# Patient Record
Sex: MALE | Race: WHITE | NOT HISPANIC OR LATINO | Employment: FULL TIME | URBAN - METROPOLITAN AREA
[De-identification: names, ages, dates, MRNs, and addresses within clinical notes are randomized per-mention and may not be internally consistent; named-entity substitution may affect disease eponyms.]

---

## 2020-02-28 ENCOUNTER — HOSPITAL ENCOUNTER (INPATIENT)
Facility: HOSPITAL | Age: 25
LOS: 5 days | Discharge: HOME | End: 2020-03-04
Attending: EMERGENCY MEDICINE | Admitting: INTERNAL MEDICINE

## 2020-02-28 DIAGNOSIS — T79.6XXA TRAUMATIC RHABDOMYOLYSIS, INITIAL ENCOUNTER (HCC): Primary | ICD-10-CM

## 2020-02-28 PROBLEM — K90.89 BILE ACID MALABSORPTION SYNDROME: Status: ACTIVE | Noted: 2020-02-28

## 2020-02-28 PROBLEM — R74.8 ELEVATED LIVER ENZYMES: Status: ACTIVE | Noted: 2020-02-28

## 2020-02-28 PROBLEM — M62.82 RHABDOMYOLYSIS: Status: ACTIVE | Noted: 2020-02-28

## 2020-02-28 PROBLEM — K52.9 COLITIS: Status: ACTIVE | Noted: 2020-02-28

## 2020-02-28 LAB
ALBUMIN SERPL-MCNC: 4.7 G/DL (ref 3.5–5.2)
ALBUMIN/GLOB SERPL: 1.5 G/DL
ALP SERPL-CCNC: 69 U/L (ref 39–117)
ALT SERPL W P-5'-P-CCNC: 447 U/L (ref 1–41)
ANION GAP SERPL CALCULATED.3IONS-SCNC: 10 MMOL/L (ref 5–15)
AST SERPL-CCNC: 1501 U/L (ref 1–40)
BACTERIA UR QL AUTO: NORMAL /HPF
BASOPHILS # BLD AUTO: 0.02 10*3/MM3 (ref 0–0.2)
BASOPHILS NFR BLD AUTO: 0.5 % (ref 0–1.5)
BILIRUB SERPL-MCNC: 0.3 MG/DL (ref 0.2–1.2)
BILIRUB UR QL STRIP: NEGATIVE
BUN BLD-MCNC: 9 MG/DL (ref 6–20)
BUN/CREAT SERPL: 9.7 (ref 7–25)
CALCIUM SPEC-SCNC: 9.6 MG/DL (ref 8.6–10.5)
CHLORIDE SERPL-SCNC: 100 MMOL/L (ref 98–107)
CK MB SERPL-CCNC: 8.13 NG/ML
CK SERPL-CCNC: ABNORMAL U/L (ref 20–200)
CLARITY UR: CLEAR
CO2 SERPL-SCNC: 31 MMOL/L (ref 22–29)
COLOR UR: YELLOW
CREAT BLD-MCNC: 0.93 MG/DL (ref 0.76–1.27)
D-LACTATE SERPL-SCNC: 0.9 MMOL/L (ref 0.5–2)
DEPRECATED RDW RBC AUTO: 37.7 FL (ref 37–54)
EOSINOPHIL # BLD AUTO: 0.08 10*3/MM3 (ref 0–0.4)
EOSINOPHIL NFR BLD AUTO: 1.9 % (ref 0.3–6.2)
ERYTHROCYTE [DISTWIDTH] IN BLOOD BY AUTOMATED COUNT: 11.6 % (ref 12.3–15.4)
GFR SERPL CREATININE-BSD FRML MDRD: 98 ML/MIN/1.73
GLOBULIN UR ELPH-MCNC: 3.1 GM/DL
GLUCOSE BLD-MCNC: 113 MG/DL (ref 65–99)
GLUCOSE UR STRIP-MCNC: NEGATIVE MG/DL
HAV IGM SERPL QL IA: NORMAL
HBV CORE IGM SERPL QL IA: NORMAL
HBV SURFACE AG SERPL QL IA: NORMAL
HCT VFR BLD AUTO: 51.3 % (ref 37.5–51)
HCV AB SER DONR QL: NORMAL
HGB BLD-MCNC: 16.9 G/DL (ref 13–17.7)
HGB UR QL STRIP.AUTO: ABNORMAL
HOLD SPECIMEN: NORMAL
HOLD SPECIMEN: NORMAL
HYALINE CASTS UR QL AUTO: NORMAL /LPF
IMM GRANULOCYTES # BLD AUTO: 0.01 10*3/MM3 (ref 0–0.05)
IMM GRANULOCYTES NFR BLD AUTO: 0.2 % (ref 0–0.5)
KETONES UR QL STRIP: NEGATIVE
LEUKOCYTE ESTERASE UR QL STRIP.AUTO: NEGATIVE
LIPASE SERPL-CCNC: 27 U/L (ref 13–60)
LYMPHOCYTES # BLD AUTO: 1.37 10*3/MM3 (ref 0.7–3.1)
LYMPHOCYTES NFR BLD AUTO: 32 % (ref 19.6–45.3)
MCH RBC QN AUTO: 29.5 PG (ref 26.6–33)
MCHC RBC AUTO-ENTMCNC: 32.9 G/DL (ref 31.5–35.7)
MCV RBC AUTO: 89.7 FL (ref 79–97)
MONOCYTES # BLD AUTO: 0.41 10*3/MM3 (ref 0.1–0.9)
MONOCYTES NFR BLD AUTO: 9.6 % (ref 5–12)
MYOGLOBIN SERPL-MCNC: 3000 NG/ML (ref 28–72)
NEUTROPHILS # BLD AUTO: 2.39 10*3/MM3 (ref 1.7–7)
NEUTROPHILS NFR BLD AUTO: 55.8 % (ref 42.7–76)
NITRITE UR QL STRIP: NEGATIVE
NRBC BLD AUTO-RTO: 0 /100 WBC (ref 0–0.2)
PH UR STRIP.AUTO: 7 [PH] (ref 5–8)
PHOSPHATE SERPL-MCNC: 3.2 MG/DL (ref 2.5–4.5)
PLATELET # BLD AUTO: 197 10*3/MM3 (ref 140–450)
PMV BLD AUTO: 10.1 FL (ref 6–12)
POTASSIUM BLD-SCNC: 5 MMOL/L (ref 3.5–5.2)
PROT SERPL-MCNC: 7.8 G/DL (ref 6–8.5)
PROT UR QL STRIP: NEGATIVE
RBC # BLD AUTO: 5.72 10*6/MM3 (ref 4.14–5.8)
RBC # UR: NORMAL /HPF
REF LAB TEST METHOD: NORMAL
SODIUM BLD-SCNC: 141 MMOL/L (ref 136–145)
SP GR UR STRIP: <=1.005 (ref 1–1.03)
SQUAMOUS #/AREA URNS HPF: NORMAL /HPF
URATE SERPL-MCNC: 3.6 MG/DL (ref 3.4–7)
UROBILINOGEN UR QL STRIP: ABNORMAL
WBC NRBC COR # BLD: 4.28 10*3/MM3 (ref 3.4–10.8)
WBC UR QL AUTO: NORMAL /HPF
WHOLE BLOOD HOLD SPECIMEN: NORMAL
WHOLE BLOOD HOLD SPECIMEN: NORMAL

## 2020-02-28 PROCEDURE — 84100 ASSAY OF PHOSPHORUS: CPT | Performed by: INTERNAL MEDICINE

## 2020-02-28 PROCEDURE — 82553 CREATINE MB FRACTION: CPT | Performed by: EMERGENCY MEDICINE

## 2020-02-28 PROCEDURE — 83874 ASSAY OF MYOGLOBIN: CPT | Performed by: EMERGENCY MEDICINE

## 2020-02-28 PROCEDURE — 84550 ASSAY OF BLOOD/URIC ACID: CPT | Performed by: INTERNAL MEDICINE

## 2020-02-28 PROCEDURE — 83690 ASSAY OF LIPASE: CPT | Performed by: EMERGENCY MEDICINE

## 2020-02-28 PROCEDURE — 82550 ASSAY OF CK (CPK): CPT | Performed by: EMERGENCY MEDICINE

## 2020-02-28 PROCEDURE — 80074 ACUTE HEPATITIS PANEL: CPT | Performed by: INTERNAL MEDICINE

## 2020-02-28 PROCEDURE — 99283 EMERGENCY DEPT VISIT LOW MDM: CPT

## 2020-02-28 PROCEDURE — 85025 COMPLETE CBC W/AUTO DIFF WBC: CPT | Performed by: EMERGENCY MEDICINE

## 2020-02-28 PROCEDURE — 81001 URINALYSIS AUTO W/SCOPE: CPT | Performed by: EMERGENCY MEDICINE

## 2020-02-28 PROCEDURE — 99222 1ST HOSP IP/OBS MODERATE 55: CPT | Performed by: INTERNAL MEDICINE

## 2020-02-28 PROCEDURE — 80053 COMPREHEN METABOLIC PANEL: CPT | Performed by: EMERGENCY MEDICINE

## 2020-02-28 PROCEDURE — 83605 ASSAY OF LACTIC ACID: CPT | Performed by: INTERNAL MEDICINE

## 2020-02-28 RX ORDER — ONDANSETRON 2 MG/ML
4 INJECTION INTRAMUSCULAR; INTRAVENOUS EVERY 6 HOURS PRN
Status: DISCONTINUED | OUTPATIENT
Start: 2020-02-28 | End: 2020-03-04 | Stop reason: HOSPADM

## 2020-02-28 RX ORDER — SODIUM CHLORIDE 0.9 % (FLUSH) 0.9 %
10 SYRINGE (ML) INJECTION EVERY 12 HOURS SCHEDULED
Status: DISCONTINUED | OUTPATIENT
Start: 2020-02-28 | End: 2020-03-04 | Stop reason: HOSPADM

## 2020-02-28 RX ORDER — SODIUM CHLORIDE 9 MG/ML
100 INJECTION, SOLUTION INTRAVENOUS CONTINUOUS
Status: DISCONTINUED | OUTPATIENT
Start: 2020-02-28 | End: 2020-03-04 | Stop reason: HOSPADM

## 2020-02-28 RX ORDER — MONTELUKAST SODIUM 4 MG/1
1 TABLET, CHEWABLE ORAL DAILY
COMMUNITY

## 2020-02-28 RX ORDER — NORTRIPTYLINE HYDROCHLORIDE 25 MG/1
25 CAPSULE ORAL NIGHTLY
COMMUNITY

## 2020-02-28 RX ORDER — SODIUM CHLORIDE 0.9 % (FLUSH) 0.9 %
10 SYRINGE (ML) INJECTION AS NEEDED
Status: DISCONTINUED | OUTPATIENT
Start: 2020-02-28 | End: 2020-03-04 | Stop reason: HOSPADM

## 2020-02-28 RX ORDER — NORTRIPTYLINE HYDROCHLORIDE 25 MG/1
25 CAPSULE ORAL NIGHTLY
Status: DISCONTINUED | OUTPATIENT
Start: 2020-02-28 | End: 2020-03-04 | Stop reason: HOSPADM

## 2020-02-28 RX ORDER — CHOLESTYRAMINE LIGHT 4 G/5.7G
1 POWDER, FOR SUSPENSION ORAL EVERY 12 HOURS SCHEDULED
Status: DISCONTINUED | OUTPATIENT
Start: 2020-02-28 | End: 2020-02-29

## 2020-02-28 RX ADMIN — SODIUM CHLORIDE 200 ML/HR: 9 INJECTION, SOLUTION INTRAVENOUS at 22:35

## 2020-02-28 RX ADMIN — SODIUM CHLORIDE 200 ML/HR: 9 INJECTION, SOLUTION INTRAVENOUS at 15:24

## 2020-02-28 RX ADMIN — SODIUM CHLORIDE, PRESERVATIVE FREE 10 ML: 5 INJECTION INTRAVENOUS at 22:05

## 2020-02-28 RX ADMIN — NORTRIPTYLINE HYDROCHLORIDE 25 MG: 25 CAPSULE ORAL at 22:04

## 2020-02-28 RX ADMIN — SODIUM CHLORIDE 2000 ML: 9 INJECTION, SOLUTION INTRAVENOUS at 14:27

## 2020-02-29 LAB
ALBUMIN SERPL-MCNC: 3.8 G/DL (ref 3.5–5.2)
ALBUMIN/GLOB SERPL: 1.5 G/DL
ALP SERPL-CCNC: 59 U/L (ref 39–117)
ALT SERPL W P-5'-P-CCNC: 339 U/L (ref 1–41)
ANION GAP SERPL CALCULATED.3IONS-SCNC: 7 MMOL/L (ref 5–15)
AST SERPL-CCNC: 948 U/L (ref 1–40)
BILIRUB SERPL-MCNC: 0.3 MG/DL (ref 0.2–1.2)
BUN BLD-MCNC: 9 MG/DL (ref 6–20)
BUN/CREAT SERPL: 10.3 (ref 7–25)
CALCIUM SPEC-SCNC: 9.1 MG/DL (ref 8.6–10.5)
CHLORIDE SERPL-SCNC: 104 MMOL/L (ref 98–107)
CK SERPL-CCNC: ABNORMAL U/L (ref 20–200)
CO2 SERPL-SCNC: 29 MMOL/L (ref 22–29)
CREAT BLD-MCNC: 0.87 MG/DL (ref 0.76–1.27)
GFR SERPL CREATININE-BSD FRML MDRD: 106 ML/MIN/1.73
GLOBULIN UR ELPH-MCNC: 2.5 GM/DL
GLUCOSE BLD-MCNC: 116 MG/DL (ref 65–99)
HOLD SPECIMEN: NORMAL
POTASSIUM BLD-SCNC: 4.4 MMOL/L (ref 3.5–5.2)
PROT SERPL-MCNC: 6.3 G/DL (ref 6–8.5)
SODIUM BLD-SCNC: 140 MMOL/L (ref 136–145)

## 2020-02-29 PROCEDURE — 80053 COMPREHEN METABOLIC PANEL: CPT | Performed by: INTERNAL MEDICINE

## 2020-02-29 PROCEDURE — 99233 SBSQ HOSP IP/OBS HIGH 50: CPT | Performed by: HOSPITALIST

## 2020-02-29 PROCEDURE — 82550 ASSAY OF CK (CPK): CPT | Performed by: INTERNAL MEDICINE

## 2020-02-29 RX ORDER — MONTELUKAST SODIUM 4 MG/1
1 TABLET, CHEWABLE ORAL DAILY
Status: DISCONTINUED | OUTPATIENT
Start: 2020-02-29 | End: 2020-03-04 | Stop reason: HOSPADM

## 2020-02-29 RX ADMIN — SODIUM CHLORIDE 200 ML/HR: 9 INJECTION, SOLUTION INTRAVENOUS at 19:57

## 2020-02-29 RX ADMIN — SODIUM CHLORIDE 200 ML/HR: 9 INJECTION, SOLUTION INTRAVENOUS at 14:52

## 2020-02-29 RX ADMIN — SODIUM CHLORIDE, PRESERVATIVE FREE 10 ML: 5 INJECTION INTRAVENOUS at 22:18

## 2020-02-29 RX ADMIN — SODIUM CHLORIDE 200 ML/HR: 9 INJECTION, SOLUTION INTRAVENOUS at 03:08

## 2020-02-29 RX ADMIN — COLESTIPOL HYDROCHLORIDE 1 G: 1 TABLET ORAL at 11:18

## 2020-02-29 RX ADMIN — SODIUM CHLORIDE 200 ML/HR: 9 INJECTION, SOLUTION INTRAVENOUS at 08:50

## 2020-02-29 RX ADMIN — NORTRIPTYLINE HYDROCHLORIDE 25 MG: 25 CAPSULE ORAL at 22:16

## 2020-03-01 LAB
ALBUMIN SERPL-MCNC: 3.8 G/DL (ref 3.5–5.2)
ALBUMIN/GLOB SERPL: 1.7 G/DL
ALP SERPL-CCNC: 63 U/L (ref 39–117)
ALT SERPL W P-5'-P-CCNC: 276 U/L (ref 1–41)
ANION GAP SERPL CALCULATED.3IONS-SCNC: 8 MMOL/L (ref 5–15)
AST SERPL-CCNC: 526 U/L (ref 1–40)
BILIRUB SERPL-MCNC: 0.2 MG/DL (ref 0.2–1.2)
BUN BLD-MCNC: 7 MG/DL (ref 6–20)
BUN/CREAT SERPL: 8.5 (ref 7–25)
CALCIUM SPEC-SCNC: 8.8 MG/DL (ref 8.6–10.5)
CHLORIDE SERPL-SCNC: 104 MMOL/L (ref 98–107)
CK SERPL-CCNC: ABNORMAL U/L (ref 20–200)
CO2 SERPL-SCNC: 30 MMOL/L (ref 22–29)
CREAT BLD-MCNC: 0.82 MG/DL (ref 0.76–1.27)
GFR SERPL CREATININE-BSD FRML MDRD: 114 ML/MIN/1.73
GLOBULIN UR ELPH-MCNC: 2.3 GM/DL
GLUCOSE BLD-MCNC: 91 MG/DL (ref 65–99)
POTASSIUM BLD-SCNC: 3.9 MMOL/L (ref 3.5–5.2)
PROT SERPL-MCNC: 6.1 G/DL (ref 6–8.5)
SODIUM BLD-SCNC: 142 MMOL/L (ref 136–145)

## 2020-03-01 PROCEDURE — 82550 ASSAY OF CK (CPK): CPT | Performed by: HOSPITALIST

## 2020-03-01 PROCEDURE — 99232 SBSQ HOSP IP/OBS MODERATE 35: CPT | Performed by: HOSPITALIST

## 2020-03-01 PROCEDURE — 80053 COMPREHEN METABOLIC PANEL: CPT | Performed by: HOSPITALIST

## 2020-03-01 RX ADMIN — SODIUM CHLORIDE 200 ML/HR: 9 INJECTION, SOLUTION INTRAVENOUS at 07:29

## 2020-03-01 RX ADMIN — SODIUM CHLORIDE, PRESERVATIVE FREE 10 ML: 5 INJECTION INTRAVENOUS at 20:41

## 2020-03-01 RX ADMIN — SODIUM CHLORIDE 200 ML/HR: 9 INJECTION, SOLUTION INTRAVENOUS at 18:25

## 2020-03-01 RX ADMIN — SODIUM CHLORIDE 200 ML/HR: 9 INJECTION, SOLUTION INTRAVENOUS at 13:35

## 2020-03-01 RX ADMIN — NORTRIPTYLINE HYDROCHLORIDE 25 MG: 25 CAPSULE ORAL at 20:42

## 2020-03-01 RX ADMIN — SODIUM CHLORIDE 200 ML/HR: 9 INJECTION, SOLUTION INTRAVENOUS at 02:25

## 2020-03-01 NOTE — PLAN OF CARE
Patients VSS, reports bilateral upper arm pain but declines PRN medications.  IV fluids continue at 200ml/hr.  Patient up independently and encouraged to get OOB and walk the halls.

## 2020-03-01 NOTE — PLAN OF CARE
Fluids continuing at 200ml/hr, tolerating w/o complaint. independently took a shower, pt rested throughout the night. Continues to complain of upper arm pain, pt refuses offered prn med for relief.

## 2020-03-01 NOTE — PROGRESS NOTES
[facility]  PROGRESS NOTE    Patient Name: Koffi Zaragoza  : 1995  MRN: [id number]    Date of Admission: 2020  Primary Care Physician: Provider, No Known    Subjective   Subjective     CC:  F/U muscle pain    HPI:  Patient seen this morning. Complains of weakness in his arms. Still feel quite sore. He is able to ambulate freely without any issues however.     Review of Systems  Gen-no fevers, no chills  CV-no chest pain, no palpitations  Resp-no cough, no dyspnea  GI-no N/V/D, no abd pain    All other systems reviewed and negative except any additional pertinent positives and negatives as discussed in HPI.       Objective   Objective     Vital Signs:   Temp:  [97.5 °F (36.4 °C)-97.9 °F (36.6 °C)] 97.6 °F (36.4 °C)  Heart Rate:  [80-85] 85  Resp:  [16-18] 16  BP: (109-141)/(70-89) 134/81        Physical Exam:  Gen-no acute distress  HENT-NCAT, mucous membranes moist  CV-RRR, S1 S2 normal, no m/r/g  Resp-CTAB, no wheezes or rales  Abd-soft, NT, ND, +BS  Ext-mild BUE edema, improved  Neuro-A&Ox3, no focal deficits  Skin-no rashes  Psych-appropriate mood      Results Reviewed:  Results from last 7 days   Lab Units 20  1317   WBC 10*3/mm3 4.28   HEMOGLOBIN g/dL 16.9   HEMATOCRIT % 51.3*   PLATELETS 10*3/mm3 197     Results from last 7 days   Lab Units 20  0957 20  0721 20  1317   SODIUM mmol/L 142 140 141   POTASSIUM mmol/L 3.9 4.4 5.0   CHLORIDE mmol/L 104 104 100   CO2 mmol/L 30.0* 29.0 31.0*   BUN mg/dL 7 9 9   CREATININE mg/dL 0.82 0.87 0.93   GLUCOSE mg/dL 91 116* 113*   CALCIUM mg/dL 8.8 9.1 9.6   ALT (SGPT) U/L 276* 339* 447*   AST (SGOT) U/L 526* 948* 1,501*     Estimated Creatinine Clearance: 179.6 mL/min (by C-G formula based on SCr of 0.82 mg/dL).    Microbiology Results Abnormal     None          Imaging Results (Last 24 Hours)     ** No results found for the last 24 hours. 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        Brief Hospital Course to date:  Koffi Zaragoza is a 24 y.o. male with hx of IBS and bile salt malabsorption syndrome who presents to the ER due to muscle pain and dark colored urine after a hard workout routine 4 days prior. Found to have rhabdomyolysis.    Acute traumatic rhabdomyolysis  --Likely due to aggressive physical activity.  --CK level still >22,000. Check daily CK levels.  --Continue aggressive IV fluids.  --Monitor renal function and electrolytes.    Transaminitis  --Related to rhabdo.  --Negative acute hepatitis panel.  --LFT's trending down, continue to monitor.    Bile acid malabsorption syndrome  --Continue Colestipol.    DVT Prophylaxis: mechanical    Disposition: I expect the patient to be discharged TBD, depends on CK level trend    CODE STATUS:   Code Status and Medical Interventions:   Ordered at: 02/28/20 1436     Level Of Support Discussed With:    Patient     Code Status:    CPR     Medical Interventions (Level of Support Prior to Arrest):    Full         Electronically signed by Sonya Lyons MD, 03/01/20, 1:12 PM.

## 2020-03-02 PROBLEM — M62.82 RHABDOMYOLYSIS: Status: ACTIVE | Noted: 2020-02-28

## 2020-03-02 LAB
ALBUMIN SERPL-MCNC: 3.8 G/DL (ref 3.5–5.2)
ALBUMIN/GLOB SERPL: 2 G/DL
ALP SERPL-CCNC: 64 U/L (ref 39–117)
ALT SERPL W P-5'-P-CCNC: 235 U/L (ref 1–41)
ANION GAP SERPL CALCULATED.3IONS-SCNC: 9 MMOL/L (ref 5–15)
AST SERPL-CCNC: 323 U/L (ref 1–40)
BILIRUB SERPL-MCNC: 0.2 MG/DL (ref 0.2–1.2)
BUN BLD-MCNC: 7 MG/DL (ref 6–20)
BUN/CREAT SERPL: 9.5 (ref 7–25)
CALCIUM SPEC-SCNC: 8.6 MG/DL (ref 8.6–10.5)
CHLORIDE SERPL-SCNC: 102 MMOL/L (ref 98–107)
CK SERPL-CCNC: ABNORMAL U/L (ref 20–200)
CO2 SERPL-SCNC: 30 MMOL/L (ref 22–29)
CREAT BLD-MCNC: 0.74 MG/DL (ref 0.76–1.27)
GFR SERPL CREATININE-BSD FRML MDRD: 128 ML/MIN/1.73
GLOBULIN UR ELPH-MCNC: 1.9 GM/DL
GLUCOSE BLD-MCNC: 109 MG/DL (ref 65–99)
POTASSIUM BLD-SCNC: 3.9 MMOL/L (ref 3.5–5.2)
PROT SERPL-MCNC: 5.7 G/DL (ref 6–8.5)
SODIUM BLD-SCNC: 141 MMOL/L (ref 136–145)

## 2020-03-02 PROCEDURE — 80053 COMPREHEN METABOLIC PANEL: CPT | Performed by: HOSPITALIST

## 2020-03-02 PROCEDURE — 99232 SBSQ HOSP IP/OBS MODERATE 35: CPT | Performed by: HOSPITALIST

## 2020-03-02 PROCEDURE — 82550 ASSAY OF CK (CPK): CPT | Performed by: HOSPITALIST

## 2020-03-02 RX ORDER — ACETAMINOPHEN 325 MG/1
650 TABLET ORAL EVERY 8 HOURS PRN
Status: DISCONTINUED | OUTPATIENT
Start: 2020-03-02 | End: 2020-03-04 | Stop reason: HOSPADM

## 2020-03-02 RX ADMIN — SODIUM CHLORIDE 200 ML/HR: 9 INJECTION, SOLUTION INTRAVENOUS at 09:15

## 2020-03-02 RX ADMIN — SODIUM CHLORIDE, PRESERVATIVE FREE 10 ML: 5 INJECTION INTRAVENOUS at 20:48

## 2020-03-02 RX ADMIN — SODIUM CHLORIDE, PRESERVATIVE FREE 10 ML: 5 INJECTION INTRAVENOUS at 09:16

## 2020-03-02 RX ADMIN — SODIUM CHLORIDE 200 ML/HR: 9 INJECTION, SOLUTION INTRAVENOUS at 10:29

## 2020-03-02 RX ADMIN — SODIUM CHLORIDE 200 ML/HR: 9 INJECTION, SOLUTION INTRAVENOUS at 21:42

## 2020-03-02 RX ADMIN — SODIUM CHLORIDE 200 ML/HR: 9 INJECTION, SOLUTION INTRAVENOUS at 15:34

## 2020-03-02 NOTE — PLAN OF CARE
Problem: Patient Care Overview  Goal: Plan of Care Review  Outcome: Ongoing (interventions implemented as appropriate)  Flowsheets  Taken 3/2/2020 1832  Progress: improving  Taken 3/2/2020 1036  Plan of Care Reviewed With: patient  Note:   Patient VSS. Took a shower and ambulated in the halls. CK levels decreasing and fluids continued at 200ml/hr. Pt complains of bilateral upper extremity pain but declines pain medication. Will continue to monitor.

## 2020-03-02 NOTE — PROGRESS NOTES
[facility] Medicine [facility]  PROGRESS NOTE    Patient Name: Koffi Zaragoza  : 1995  MRN: [id number]    Date of Admission: 2020  Primary Care Physician: Provider, No Known    Subjective   Subjective     CC:  F/U muscle pain    HPI:  Patient seen this morning. Complains of some tightness on the back of his right arm. Still sore in arms but overall feels fine.    Review of Systems  Gen-no fevers, no chills  CV-no chest pain, no palpitations  Resp-no cough, no dyspnea  GI-no N/V/D, no abd pain    All other systems reviewed and negative except any additional pertinent positives and negatives as discussed in HPI.       Objective   Objective     Vital Signs:   Temp:  [97.2 °F (36.2 °C)-98.2 °F (36.8 °C)] 98.1 °F (36.7 °C)  Heart Rate:  [79-82] 79  Resp:  [16-18] 18  BP: (109-128)/(60-89) 128/84        Physical Exam:  Gen-no acute distress  HENT-NCAT, mucous membranes moist  CV-RRR, S1 S2 normal, no m/r/g  Resp-CTAB, no wheezes or rales  Abd-soft, NT, ND, +BS  Ext-mild BUE muscle swelling, improved  Neuro-A&Ox3, no focal deficits  Skin-no rashes  Psych-appropriate mood      Results Reviewed:  Results from last 7 days   Lab Units 20  1317   WBC 10*3/mm3 4.28   HEMOGLOBIN g/dL 16.9   HEMATOCRIT % 51.3*   PLATELETS 10*3/mm3 197     Results from last 7 days   Lab Units 20  0513 20  0957 20  0721   SODIUM mmol/L 141 142 140   POTASSIUM mmol/L 3.9 3.9 4.4   CHLORIDE mmol/L 102 104 104   CO2 mmol/L 30.0* 30.0* 29.0   BUN mg/dL 7 7 9   CREATININE mg/dL 0.74* 0.82 0.87   GLUCOSE mg/dL 109* 91 116*   CALCIUM mg/dL 8.6 8.8 9.1   ALT (SGPT) U/L 235* 276* 339*   AST (SGOT) U/L 323* 526* 948*     Estimated Creatinine Clearance: 199 mL/min (A) (by C-G formula based on SCr of 0.74 mg/dL (L)).    Microbiology Results Abnormal     None          Imaging Results (Last 24 Hours)     ** No results found for the last 24 hours. 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        Brief Hospital Course to date:  Koffi Zaragoza is a 24 y.o. male with hx of IBS and bile salt malabsorption syndrome who presents to the ER due to muscle pain and dark colored urine after a hard workout routine 4 days prior. Found to have rhabdomyolysis.    Acute traumatic rhabdomyolysis  --Likely due to aggressive physical activity.  --CK level now detectable at 10,625. Check daily CK levels.  --Continue aggressive IV fluids.  --Monitor renal function and electrolytes.    Transaminitis  --Related to rhabdo.  --Negative acute hepatitis panel.  --LFT's trending down, continue to monitor.    Bile acid malabsorption syndrome  --Continue Colestipol.    DVT Prophylaxis: mechanical    Disposition: I expect the patient to be discharged ~2-3 days, based on CK level trend    CODE STATUS:   Code Status and Medical Interventions:   Ordered at: 02/28/20 1436     Level Of Support Discussed With:    Patient     Code Status:    CPR     Medical Interventions (Level of Support Prior to Arrest):    Full         Electronically signed by Sonya Lyons MD, 03/02/20, 2:59 PM.

## 2020-03-02 NOTE — PROGRESS NOTES
Discharge Planning Assessment  [facility]     Patient Name: Koffi Zaragoza  MRN: [id number]  Today's Date: 3/2/2020    Admit Date: 2/28/2020    Discharge Needs Assessment     Row Name 03/02/20 1529       Living Environment    Lives With  spouse    Name(s) of Who Lives With Patient  Ale Zaragoza    Current Living Arrangements  home/apartment/condo    Primary Care Provided by  self    Provides Primary Care For  no one    Family Caregiver if Needed  spouse    Family Caregiver Names  Ale Zaragoza    Quality of Family Relationships  unable to assess    Able to Return to Prior Arrangements  yes       Resource/Environmental Concerns    Resource/Environmental Concerns  none       Transition Planning    Patient/Family Anticipates Transition to  home    Transportation Anticipated  car, drives self       Discharge Needs Assessment    Readmission Within the Last 30 Days  no previous admission in last 30 days    Concerns to be Addressed  no discharge needs identified;denies needs/concerns at this time    Equipment Currently Used at Home  none    Anticipated Changes Related to Illness  none    Equipment Needed After Discharge  none    Provided Post Acute Provider List?  N/A    N/A Provider List Comment  denies use or need    Discharge Coordination/Progress  Home        Discharge Plan     Row Name 03/02/20 153       Plan    Plan  Home    Patient/Family in Agreement with Plan  yes    Plan Comments  Spoke with patient at bedside regarding discharge planning.  Patient denies use or need for HH or DME and reports he has prescrition coverage.  Patient live with his wife in a mobile home with stairs to access but reports he is in excellent health and denies concern regarding home safety.  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  No discharge needs verbalized.  CM following. Patient plan is to discharge home via car driving self.      Final Discharge  Disposition Code  01 - home or self-care        Destination      Coordination has not been started for this encounter.      Durable Medical Equipment      Coordination has not been started for this encounter.      Dialysis/Infusion      Coordination has not been started for this encounter.      Home Medical Care      Coordination has not been started for this encounter.      Therapy      Coordination has not been started for this encounter.      Community Resources      Coordination has not been started for this encounter.        Expected Discharge Date and Time     Expected Discharge Date Expected Discharge Time    Mar 5, 2020         Demographic Summary     Row Name 03/02/20 1525       General Information    Admission Type  inpatient    Arrived From  home    Referral Source  admission list    Reason for Consult  discharge planning    Preferred Language  English     Used During This Interaction  no    General Information Comments  [facility]       Contact Information    Permission Granted to Share Info With      Contact Information Obtained for      Contact Information Comments  Ale Zaragoza, spouse  [phone]        Functional Status     Row Name 03/02/20 1528       Functional Status    Usual Activity Tolerance  excellent    Current Activity Tolerance  good       Functional Status, IADL    Medications  independent    Meal Preparation  independent    Housekeeping  independent    Laundry  independent    Shopping  independent       Employment/    Employment/ Comments  Workmans Comp        Psychosocial    No documentation.       Abuse/Neglect    No documentation.       Legal    No documentation.       Substance Abuse    No documentation.       Patient Forms    No documentation.           Jennifer Brower, RN

## 2020-03-03 LAB
ALBUMIN SERPL-MCNC: 3.6 G/DL (ref 3.5–5.2)
ALBUMIN/GLOB SERPL: 1.4 G/DL
ALP SERPL-CCNC: 60 U/L (ref 39–117)
ALT SERPL W P-5'-P-CCNC: 198 U/L (ref 1–41)
ANION GAP SERPL CALCULATED.3IONS-SCNC: 11 MMOL/L (ref 5–15)
AST SERPL-CCNC: 179 U/L (ref 1–40)
BILIRUB SERPL-MCNC: 0.2 MG/DL (ref 0.2–1.2)
BUN BLD-MCNC: 8 MG/DL (ref 6–20)
BUN/CREAT SERPL: 11.4 (ref 7–25)
CALCIUM SPEC-SCNC: 8.8 MG/DL (ref 8.6–10.5)
CHLORIDE SERPL-SCNC: 102 MMOL/L (ref 98–107)
CK SERPL-CCNC: 4681 U/L (ref 20–200)
CO2 SERPL-SCNC: 28 MMOL/L (ref 22–29)
CREAT BLD-MCNC: 0.7 MG/DL (ref 0.76–1.27)
GFR SERPL CREATININE-BSD FRML MDRD: 136 ML/MIN/1.73
GLOBULIN UR ELPH-MCNC: 2.5 GM/DL
GLUCOSE BLD-MCNC: 101 MG/DL (ref 65–99)
POTASSIUM BLD-SCNC: 4.3 MMOL/L (ref 3.5–5.2)
PROT SERPL-MCNC: 6.1 G/DL (ref 6–8.5)
SODIUM BLD-SCNC: 141 MMOL/L (ref 136–145)

## 2020-03-03 PROCEDURE — 80053 COMPREHEN METABOLIC PANEL: CPT | Performed by: HOSPITALIST

## 2020-03-03 PROCEDURE — 82550 ASSAY OF CK (CPK): CPT | Performed by: HOSPITALIST

## 2020-03-03 PROCEDURE — 99232 SBSQ HOSP IP/OBS MODERATE 35: CPT | Performed by: HOSPITALIST

## 2020-03-03 RX ADMIN — SODIUM CHLORIDE 200 ML/HR: 9 INJECTION, SOLUTION INTRAVENOUS at 23:40

## 2020-03-03 RX ADMIN — NORTRIPTYLINE HYDROCHLORIDE 25 MG: 25 CAPSULE ORAL at 22:13

## 2020-03-03 RX ADMIN — SODIUM CHLORIDE, PRESERVATIVE FREE 10 ML: 5 INJECTION INTRAVENOUS at 08:21

## 2020-03-03 RX ADMIN — SODIUM CHLORIDE 200 ML/HR: 9 INJECTION, SOLUTION INTRAVENOUS at 02:12

## 2020-03-03 RX ADMIN — SODIUM CHLORIDE 200 ML/HR: 9 INJECTION, SOLUTION INTRAVENOUS at 17:29

## 2020-03-03 RX ADMIN — NORTRIPTYLINE HYDROCHLORIDE 25 MG: 25 CAPSULE ORAL at 00:07

## 2020-03-03 RX ADMIN — SODIUM CHLORIDE, PRESERVATIVE FREE 10 ML: 5 INJECTION INTRAVENOUS at 22:14

## 2020-03-03 RX ADMIN — ACETAMINOPHEN 650 MG: 325 TABLET, FILM COATED ORAL at 22:13

## 2020-03-03 RX ADMIN — SODIUM CHLORIDE 200 ML/HR: 9 INJECTION, SOLUTION INTRAVENOUS at 06:27

## 2020-03-03 RX ADMIN — SODIUM CHLORIDE 200 ML/HR: 9 INJECTION, SOLUTION INTRAVENOUS at 16:15

## 2020-03-03 NOTE — PROGRESS NOTES
Continued Stay Note   [place]     Patient Name: Koffi Zaragoza  MRN: [id number]  Today's Date: 3/3/2020    Admit Date: 2/28/2020    Discharge Plan     Row Name 03/03/20 1211       Plan    Plan  update    Patient/Family in Agreement with Plan  yes    Plan Comments  Spoke with patient and wife at bedside regarding discharge plan.  Patient reports that it was indicated that he may be able to go home as early as tomorrow depending on labwork.  No discharge needs verbalized.  CM following.  Patient plan is to discharge home via car with family to transport.      Final Discharge Disposition Code  01 - home or self-care        Discharge Codes    No documentation.       Expected Discharge Date and Time     Expected Discharge Date Expected Discharge Time    Mar 5, 2020             Jennifer Brower RN

## 2020-03-03 NOTE — PROGRESS NOTES
[facility]  PROGRESS NOTE    Patient Name: Koffi Zaragoza  : 1995  MRN: [id number]    Date of Admission: 2020  Primary Care Physician: Provider, No Known    Subjective   Subjective     CC:  F/U muscle pain    HPI:  Patient seen this morning. Still feels sore and tight in his arm muscles but generally feels well otherwise, ambulating frequently.    Review of Systems  Gen-no fevers, no chills  CV-no chest pain, no palpitations  Resp-no cough, no dyspnea  GI-no N/V/D, no abd pain    All other systems reviewed and negative except any additional pertinent positives and negatives as discussed in HPI.       Objective   Objective     Vital Signs:   Temp:  [97.3 °F (36.3 °C)-98.2 °F (36.8 °C)] 97.3 °F (36.3 °C)  Heart Rate:  [72-78] 74  Resp:  [18] 18  BP: (112-130)/(72-90) 121/80        Physical Exam:  Gen-no acute distress  HENT-NCAT, mucous membranes moist  CV-RRR, S1 S2 normal, no m/r/g  Resp-CTAB, no wheezes or rales  Abd-soft, NT, ND, +BS  Ext-mild BUE muscle swelling and tightness  Neuro-A&Ox3, no focal deficits  Skin-no rashes  Psych-appropriate mood      Results Reviewed:  Results from last 7 days   Lab Units 20  1317   WBC 10*3/mm3 4.28   HEMOGLOBIN g/dL 16.9   HEMATOCRIT % 51.3*   PLATELETS 10*3/mm3 197     Results from last 7 days   Lab Units 20  0605 20  0513 20  0957   SODIUM mmol/L 141 141 142   POTASSIUM mmol/L 4.3 3.9 3.9   CHLORIDE mmol/L 102 102 104   CO2 mmol/L 28.0 30.0* 30.0*   BUN mg/dL 8 7 7   CREATININE mg/dL 0.70* 0.74* 0.82   GLUCOSE mg/dL 101* 109* 91   CALCIUM mg/dL 8.8 8.6 8.8   ALT (SGPT) U/L 198* 235* 276*   AST (SGOT) U/L 179* 323* 526*     Estimated Creatinine Clearance: 210.4 mL/min (A) (by C-G formula based on SCr of 0.7 mg/dL (L)).    Microbiology Results Abnormal     None          Imaging Results (Last 24 Hours)     ** No results found for the last 24 hours. 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        Brief Hospital Course to date:  Koffi Zaragoza is a 24 y.o. male with hx of IBS and bile salt malabsorption syndrome who presents to the ER due to muscle pain and dark colored urine after a hard workout routine 4 days prior. Found to have rhabdomyolysis.    Acute non-traumatic rhabdomyolysis  --Likely due to aggressive physical activity.  --CK level trending down to 4000 range today. Patient would like for it to be under 1000 before he goes home. Check daily CK levels.  --Continue aggressive IV fluids.  --Monitor renal function and electrolytes.    Transaminitis  --Related to rhabdo.  --Negative acute hepatitis panel.  --LFT's trending down, continue to monitor.    Bile acid malabsorption syndrome  --Continue Colestipol.    DVT Prophylaxis: mechanical    Disposition: I expect the patient to be discharged ~1-2 days, based on CK level; he will need labs drawn next week to ensure normalization of CK and LFT's    I have placed request for his and wife's work excuses in anticipation of discharge in next day or two.    CODE STATUS:   Code Status and Medical Interventions:   Ordered at: 02/28/20 1436     Level Of Support Discussed With:    Patient     Code Status:    CPR     Medical Interventions (Level of Support Prior to Arrest):    Full         Electronically signed by Sonya Lyons MD, 03/03/20, 1:49 PM.

## 2020-03-03 NOTE — PLAN OF CARE
Problem: Patient Care Overview  Goal: Plan of Care Review  Outcome: Ongoing (interventions implemented as appropriate)  Flowsheets (Taken 3/3/2020 2612)  Progress: improving  Plan of Care Reviewed With: patient; spouse  Outcome Summary: VSS. IVF @200 continued. Pt showered. Complaints of pain bilateral upper extremities but declines pain medication. CK trending down. Will continue to monitor.

## 2020-03-03 NOTE — PAYOR COMM NOTE
"Claim # [id number]  Tereza Moscoso RN, BSN  Phone # [phone]  Fax # [phone]  Rojelio Zaragoza (24 y.o. Male)     Date of Birth Social Security Number Address Home Phone MRN    1995  [address] [address] [phone] [id number]    [employer] Marital Status          Mandaen        Admission Date Admission Type Admitting Provider Attending Provider Department, Room/Bed    20 Emergency Sonya Lyons MD Reddy, Mayuri V, MD [facility] 6B, N646/1    Discharge Date Discharge Disposition Discharge Destination                       Attending Provider:  Sonya Lyons MD    Allergies:  No Known Allergies    Isolation:  None   Infection:  None   Code Status:  CPR    Ht:  200.7 cm (79\")   Wt:  93 kg (205 lb)    Admission Cmt:  None   Principal Problem:  Rhabdomyolysis [M62.82]                 Active Insurance as of 2020     Primary Coverage     Payor Plan Insurance Group Employer/Plan Group    WORKERS COMPENSATION RISK MANAGEMENT      Payor Plan Address Payor Plan Phone Number Payor Plan Fax Number Effective Dates    [address] [address] [phone]  2020 - None Entered    [facility] [id number]       Subscriber Name Subscriber Birth Date Member ID       ROJELIO ZARAGOZA 1995                     History & Physical      Eileen Reese DO at 20 1530              [facility] Medicine Services  HISTORY AND PHYSICAL    Patient Name: Rojelio Zaragoza  : 1995  MRN: [id number]  Primary Care Physician: Provider, No Known  Date of admission: 2020      Subjective   Subjective     Chief Complaint:  Muscle pain    HPI:  Rojelio Zaragoza is a 24 y.o. male with PMHx significant for colitis and bile salt malabsorbtion syndrome who presents to the ED with muscle pain and dark colored urine. Patient reports Monday he worked out relatively hard, also notes increased fatigue from working 13 days prior with long hours on his feet. " "He states Tuesday he woke up the \"sorest I've ever been\". On Wednesday he noted darkening of his urine. He tried drinking lots of water during the day however didn't notice any improvement. Yesterday he went to the [facility] clinic where they kristian labs. Labwork returned today with CK >50K and Alk Phos 1247,  and he was directed to the ED. He complains of some swelling in his arms and pain in his tricep area.    Review of Systems   Gen- No fevers, chills  CV- No chest pain, palpitations  Resp- No cough, dyspnea  GI- No N/V/D, abd pain      All other systems reviewed and are negative.     Personal History     Past Medical History:   Diagnosis Date   • Bile acid malabsorption syndrome    • Colitis    • IBS (irritable bowel syndrome)        Past Surgical History:   Procedure Laterality Date   • CHOLECYSTECTOMY     • FINGER SURGERY Right        Family History: family history is not on file. Otherwise pertinent FHx was reviewed and unremarkable.     Social History:  reports that he has never smoked. He has never used smokeless tobacco. He reports that he drinks alcohol. He reports that he does not use drugs.  Social History     Social History Narrative   • Not on file       Medications:  Available home medication information reviewed.  Medications Prior to Admission   Medication Sig Dispense Refill Last Dose   • colestipol (COLESTID) 1 g tablet Take 1 g by mouth 2 (Two) Times a Day.      • nortriptyline (PAMELOR) 25 MG capsule Take 25 mg by mouth Every Night.      • NORTRIPTYLINE HCL PO Take  by mouth.          No Known Allergies    Objective   Objective     Vital Signs:   Temp:  [97.8 °F (36.6 °C)-98.5 °F (36.9 °C)] 97.8 °F (36.6 °C)  Heart Rate:  [] 70  Resp:  [18] 18  BP: (131-146)/() 131/95        Physical Exam   Constitutional: Awake, alert  Eyes: PERRLA, sclerae anicteric, no conjunctival injection  HENT: NCAT, mucous membranes moist  Neck: Supple, no thyromegaly, no lymphadenopathy, trachea " midline  Respiratory: Clear to auscultation bilaterally, nonlabored respirations   Cardiovascular: RRR, no murmurs, rubs, or gallops, palpable pedal pulses bilaterally  Gastrointestinal: Positive bowel sounds, soft, nontender, nondistended  Musculoskeletal: edema in bilateral upper extremities is mild, no LE edema  Psychiatric: Appropriate affect, cooperative  Neurologic: Oriented x 3, strength symmetric in all extremities, Cranial Nerves grossly intact to confrontation, speech clear  Skin: No rashes      Results Reviewed:  I have personally reviewed current lab and radiology data.    Results from last 7 days   Lab Units 02/28/20  1317   WBC 10*3/mm3 4.28   HEMOGLOBIN g/dL 16.9   HEMATOCRIT % 51.3*   PLATELETS 10*3/mm3 197     Results from last 7 days   Lab Units 02/28/20  1317   SODIUM mmol/L 141   POTASSIUM mmol/L 5.0   CHLORIDE mmol/L 100   CO2 mmol/L 31.0*   BUN mg/dL 9   CREATININE mg/dL 0.93   GLUCOSE mg/dL 113*   CALCIUM mg/dL 9.6   ALT (SGPT) U/L 447*   AST (SGOT) U/L 1,501*     Estimated Creatinine Clearance: 158.3 mL/min (by C-G formula based on SCr of 0.93 mg/dL).  Brief Urine Lab Results  (Last result in the past 365 days)      Color   Clarity   Blood   Leuk Est   Nitrite   Protein   CREAT   Urine HCG        02/28/20 1318 Yellow Clear Large (3+) Negative Negative Negative             Imaging Results (Last 24 Hours)     ** No results found for the last 24 hours. **             Assessment/Plan   Assessment & Plan     Active Hospital Problems    Diagnosis POA   • **Traumatic rhabdomyolysis (CMS/HCC) [T79.6XXA] Yes   • Colitis [K52.9] Yes   • Bile acid malabsorption syndrome [K90.9] Yes   • Elevated liver enzymes [R74.8] Yes     Koffi Zaragoza is a 24 y.o. male with PMHx significant for colitis and bile salt malabsorbtion syndrome who presents to the ED with muscle pain and dark colored urine, found to have Rhabdo with CK >22K    Acute Traumatic Rhabdomyolysis  - likely precipitated by aggressive physical  activity, prolonged hours standing  - continue aggressive fluids, s/p 2L in the ED, continue NS @ 200cc/hr  - will repeat CK in the AM. Asked lab to quantify value, but they state they are unable to give me a value any higher than what is measured. Of note, was >50K yesterday at [facility]  - monitor renal function, currently is normal  - monitor for compartment syndrome  - monitor electrolytes including K, Phos, Uric acid    Elevated Liver Enzymes:  - likely secondary to Rhabdo  - check hepatitis panel  - patient drinks only socially    Bile Acid Malabsorption syndrome  - on Colestipol    IBS:  - patient says he follows with Dr. Bejarano, on Pamelor    DVT prophylaxis:  SCDs    CODE STATUS:    Code Status and Medical Interventions:   Ordered at: 02/28/20 1436     Level Of Support Discussed With:    Patient     Code Status:    CPR     Medical Interventions (Level of Support Prior to Arrest):    Full       Admission Status:  I believe this patient meets INPATIENT status due to sever rhabdo which will likely take several days to correct.  I feel patient’s risk for adverse outcomes and need for care warrant INPATIENT evaluation and I predict the patient’s care encounter to likely last beyond 2 midnights.      Electronically signed by Eileen Reese DO, 02/28/20, 3:30 PM.      Electronically signed by Eileen Reese DO at 02/28/20 1547          Emergency Department Notes      Wesley Brown MD at 02/28/20 1322          Ashlie Zaragoza is a 24 y.o. male who presents to the ED with c/o muscle pain. The patient reports that he has been going through intense and vigorous training for his occupation recently which has resulted in muscle pain and soreness. 2 days ago he noticed his urine was dark colored and the color has persisted despite him reportedly consuming a lot of water. Yesterday morning he discussed his symptoms with the physician that is on location at his occupation who informed him that he was  presenting symptoms of rhabdomyolysis, advising him to present to the ED or [facility] for evaluation. He presented to [facility] today for urine and blood lab work to be done and he was informed that there was protein and blood present in his urine, prompting him to present to the ED for further evaluation. The patient complains of myalgia. He has no pertinent past medical history or surgical history. There are no other acute complaints at this time.      Muscle Pain   Location:  Generalized  Quality:  Soreness  Severity:  Moderate  Onset quality:  Sudden  Duration:  4 days  Timing:  Constant  Progression:  Worsening  Chronicity:  New  Relieved by:  None tried  Worsened by:  Nothing  Ineffective treatments:  None treid  Associated symptoms: abdominal pain and myalgias    Associated symptoms: no fever, no headaches, no loss of consciousness, no nausea and no vomiting        Review of Systems   Constitutional: Positive for activity change. Negative for fever.        The patient has been engaging in intense training recently.   Gastrointestinal: Positive for abdominal pain. Negative for nausea and vomiting.   Musculoskeletal: Positive for myalgias.        The patient complains of generalized musculature pain and soreness.   Neurological: Negative for dizziness, loss of consciousness, syncope, weakness, light-headedness and headaches.   All other systems reviewed and are negative.      Past Medical History:   Diagnosis Date   • Bile acid malabsorption syndrome    • Colitis    • IBS (irritable bowel syndrome)        No Known Allergies    Past Surgical History:   Procedure Laterality Date   • CHOLECYSTECTOMY     • FINGER SURGERY Right        History reviewed. No pertinent family history.    Social History     Socioeconomic History   • Marital status:      Spouse name: Not on file   • Number of children: Not on file   • Years of education: Not on file   • Highest education level: Not on file   Tobacco Use   • Smoking status:  Never Smoker   • Smokeless tobacco: Never Used   Substance and Sexual Activity   • Alcohol use: Yes     Comment: beers x2 days a week    • Drug use: Never         Objective   Physical Exam   Constitutional: He is oriented to person, place, and time. He appears well-developed and well-nourished.   HENT:   Head: Normocephalic and atraumatic.   Eyes: Conjunctivae are normal. No scleral icterus.   Neck: Neck supple.   Cardiovascular: Normal rate.   Pulmonary/Chest: Effort normal.   Musculoskeletal: Normal range of motion.   Neurological: He is alert and oriented to person, place, and time.   Skin: Skin is warm and dry.   Psychiatric: He has a normal mood and affect. His behavior is normal. Thought content normal.   Nursing note and vitals reviewed.      Procedures        ED Course                                                MDM  Number of Diagnoses or Management Options  Traumatic rhabdomyolysis, initial encounter (CMS/[facility]):      Amount and/or Complexity of Data Reviewed  Clinical lab tests: reviewed and ordered  Discuss the patient with other providers: yes        Final diagnoses:   Traumatic rhabdomyolysis, initial encounter (CMS/[facility])       Documentation assistance provided by donny Faulkner.  Information recorded by the donny was done at my direction and has been verified and validated by me.     Dominick Faulkner  20 1412       Wesley Brown MD  20 1921      Electronically signed by Wesley Brown MD at 20 1921            Physician Progress Notes (last 72 hours) (Notes from 20 1252 through 20 1252)      Sonya Lyons MD at 20 1456              [facility] Medicine Services  PROGRESS NOTE    Patient Name: Koffi Zaragoza  : 1995  MRN: [id number]    Date of Admission: 2020  Primary Care Physician: Provider, No Known    Subjective   Subjective     CC:  F/U muscle pain    HPI:  Patient seen this morning. Complains of some tightness  on the back of his right arm. Still sore in arms but overall feels fine.    Review of Systems  Gen-no fevers, no chills  CV-no chest pain, no palpitations  Resp-no cough, no dyspnea  GI-no N/V/D, no abd pain    All other systems reviewed and negative except any additional pertinent positives and negatives as discussed in HPI.       Objective   Objective     Vital Signs:   Temp:  [97.2 °F (36.2 °C)-98.2 °F (36.8 °C)] 98.1 °F (36.7 °C)  Heart Rate:  [79-82] 79  Resp:  [16-18] 18  BP: (109-128)/(60-89) 128/84        Physical Exam:  Gen-no acute distress  HENT-NCAT, mucous membranes moist  CV-RRR, S1 S2 normal, no m/r/g  Resp-CTAB, no wheezes or rales  Abd-soft, NT, ND, +BS  Ext-mild BUE muscle swelling, improved  Neuro-A&Ox3, no focal deficits  Skin-no rashes  Psych-appropriate mood      Results Reviewed:  Results from last 7 days   Lab Units 02/28/20  1317   WBC 10*3/mm3 4.28   HEMOGLOBIN g/dL 16.9   HEMATOCRIT % 51.3*   PLATELETS 10*3/mm3 197     Results from last 7 days   Lab Units 03/02/20  0513 03/01/20  0957 02/29/20  0721   SODIUM mmol/L 141 142 140   POTASSIUM mmol/L 3.9 3.9 4.4   CHLORIDE mmol/L 102 104 104   CO2 mmol/L 30.0* 30.0* 29.0   BUN mg/dL 7 7 9   CREATININE mg/dL 0.74* 0.82 0.87   GLUCOSE mg/dL 109* 91 116*   CALCIUM mg/dL 8.6 8.8 9.1   ALT (SGPT) U/L 235* 276* 339*   AST (SGOT) U/L 323* 526* 948*     Estimated Creatinine Clearance: 199 mL/min (A) (by C-G formula based on SCr of 0.74 mg/dL (L)).    Microbiology Results Abnormal     None          Imaging Results (Last 24 Hours)     ** No results found for the last 24 hours. **               I have reviewed the medications:  Scheduled Meds:    colestipol 1 g Oral Daily   nortriptyline 25 mg Oral Nightly   sodium chloride 10 mL Intravenous Q12H     Continuous Infusions:    sodium chloride 200 mL/hr Last Rate: 200 mL/hr (03/02/20 1029)     PRN Meds:.•  acetaminophen  •  ondansetron  •  sodium chloride  •  sodium chloride    Assessment/Plan   Assessment &  Plan     Active Hospital Problems    Diagnosis  POA   • **Traumatic rhabdomyolysis (CMS/HCC) [T79.6XXA]  Yes   • Bile acid malabsorption syndrome [K90.9]  Yes   • Elevated liver enzymes [R74.8]  Yes      Resolved Hospital Problems   No resolved problems to display.        Brief Hospital Course to date:  Koffi Zaragoza is a 24 y.o. male with hx of IBS and bile salt malabsorption syndrome who presents to the ER due to muscle pain and dark colored urine after a hard workout routine 4 days prior. Found to have rhabdomyolysis.    Acute traumatic rhabdomyolysis  --Likely due to aggressive physical activity.  --CK level now detectable at 10,625. Check daily CK levels.  --Continue aggressive IV fluids.  --Monitor renal function and electrolytes.    Transaminitis  --Related to rhabdo.  --Negative acute hepatitis panel.  --LFT's trending down, continue to monitor.    Bile acid malabsorption syndrome  --Continue Colestipol.    DVT Prophylaxis: mechanical    Disposition: I expect the patient to be discharged ~2-3 days, based on CK level trend    CODE STATUS:   Code Status and Medical Interventions:   Ordered at: 20 1436     Level Of Support Discussed With:    Patient     Code Status:    CPR     Medical Interventions (Level of Support Prior to Arrest):    Full         Electronically signed by Sonya Lyons MD, 20, 2:59 PM.        Electronically signed by Sonya Lyons MD at 20 1459     Sonya Lyons MD at 20 1310              [facility] Medicine Services  PROGRESS NOTE    Patient Name: Koffi Zaragoza  : 1995  MRN: [id number]    Date of Admission: 2020  Primary Care Physician: Provider, No Known    Subjective   Subjective     CC:  F/U muscle pain    HPI:  Patient seen this morning. Complains of weakness in his arms. Still feel quite sore. He is able to ambulate freely without any issues however.     Review of Systems  Gen-no fevers, no chills  CV-no chest pain, no  palpitations  Resp-no cough, no dyspnea  GI-no N/V/D, no abd pain    All other systems reviewed and negative except any additional pertinent positives and negatives as discussed in HPI.       Objective   Objective     Vital Signs:   Temp:  [97.5 °F (36.4 °C)-97.9 °F (36.6 °C)] 97.6 °F (36.4 °C)  Heart Rate:  [80-85] 85  Resp:  [16-18] 16  BP: (109-141)/(70-89) 134/81        Physical Exam:  Gen-no acute distress  HENT-NCAT, mucous membranes moist  CV-RRR, S1 S2 normal, no m/r/g  Resp-CTAB, no wheezes or rales  Abd-soft, NT, ND, +BS  Ext-mild BUE edema, improved  Neuro-A&Ox3, no focal deficits  Skin-no rashes  Psych-appropriate mood      Results Reviewed:  Results from last 7 days   Lab Units 02/28/20  1317   WBC 10*3/mm3 4.28   HEMOGLOBIN g/dL 16.9   HEMATOCRIT % 51.3*   PLATELETS 10*3/mm3 197     Results from last 7 days   Lab Units 03/01/20  0957 02/29/20  0721 02/28/20  1317   SODIUM mmol/L 142 140 141   POTASSIUM mmol/L 3.9 4.4 5.0   CHLORIDE mmol/L 104 104 100   CO2 mmol/L 30.0* 29.0 31.0*   BUN mg/dL 7 9 9   CREATININE mg/dL 0.82 0.87 0.93   GLUCOSE mg/dL 91 116* 113*   CALCIUM mg/dL 8.8 9.1 9.6   ALT (SGPT) U/L 276* 339* 447*   AST (SGOT) U/L 526* 948* 1,501*     Estimated Creatinine Clearance: 179.6 mL/min (by C-G formula based on SCr of 0.82 mg/dL).    Microbiology Results Abnormal     None          Imaging Results (Last 24 Hours)     ** No results found for the last 24 hours. **               I have reviewed the medications:  Scheduled Meds:    colestipol 1 g Oral Daily   nortriptyline 25 mg Oral Nightly   sodium chloride 10 mL Intravenous Q12H     Continuous Infusions:    sodium chloride 200 mL/hr Last Rate: 200 mL/hr (03/01/20 0729)     PRN Meds:.ondansetron  •  sodium chloride  •  sodium chloride    Assessment/Plan   Assessment & Plan     Active Hospital Problems    Diagnosis  POA   • **Traumatic rhabdomyolysis (CMS/HCC) [T79.6XXA]  Yes   • Bile acid malabsorption syndrome [K90.9]  Yes   • Elevated liver  enzymes [R74.8]  Yes      Resolved Hospital Problems   No resolved problems to display.        Brief Hospital Course to date:  Koffi Zaragoza is a 24 y.o. male with hx of IBS and bile salt malabsorption syndrome who presents to the ER due to muscle pain and dark colored urine after a hard workout routine 4 days prior. Found to have rhabdomyolysis.    Acute traumatic rhabdomyolysis  --Likely due to aggressive physical activity.  --CK level still >22,000. Check daily CK levels.  --Continue aggressive IV fluids.  --Monitor renal function and electrolytes.    Transaminitis  --Related to rhabdo.  --Negative acute hepatitis panel.  --LFT's trending down, continue to monitor.    Bile acid malabsorption syndrome  --Continue Colestipol.    DVT Prophylaxis: mechanical    Disposition: I expect the patient to be discharged TBD, depends on CK level trend    CODE STATUS:   Code Status and Medical Interventions:   Ordered at: 02/28/20 1436     Level Of Support Discussed With:    Patient     Code Status:    CPR     Medical Interventions (Level of Support Prior to Arrest):    Full         Electronically signed by Sonya Lyons MD, 03/01/20, 1:12 PM.        Electronically signed by Sonya Lyons MD at 03/01/20 1312       Jennifer Brower, RN      Case Management   Progress Notes   Signed   Date of Service:  03/02/20 1534   Creation Time:  03/02/20 1534            Signed             Show:Clear all  []Manual[x]Template[]Copied    Added by:  [x]Jennifer Brower, RN    []Pro for details  Discharge Planning Assessment  [facility]     Patient Name: Koffi Zaragoza                      MRN: [id number]  Today's Date: 3/2/2020                       Admit Date: 2/28/2020          Discharge Needs Assessment      Row Name 03/02/20 1529           Living Environment     Lives With  spouse     Name(s) of Who Lives With Patient  Ale Zaragoza     Current Living Arrangements  home/apartment/condo     Primary Care Provided by   self     Provides Primary Care For  no one     Family Caregiver if Needed  spouse     Family Caregiver Names  Ale Zaragoza     Quality of Family Relationships  unable to assess     Able to Return to Prior Arrangements  yes           Resource/Environmental Concerns     Resource/Environmental Concerns  none           Transition Planning     Patient/Family Anticipates Transition to  home     Transportation Anticipated  car, drives self           Discharge Needs Assessment     Readmission Within the Last 30 Days  no previous admission in last 30 days     Concerns to be Addressed  no discharge needs identified;denies needs/concerns at this time     Equipment Currently Used at Home  none     Anticipated Changes Related to Illness  none     Equipment Needed After Discharge  none     Provided Post Acute Provider List?  N/A     N/A Provider List Comment  denies use or need     Discharge Coordination/Progress  Home               Discharge Plan      Row Name 03/02/20 1538           Plan     Plan  Home     Patient/Family in Agreement with Plan  yes     Plan Comments  Spoke with patient at bedside regarding discharge planning.  Patient denies use or need for HH or DME and reports he has prescrition coverage.  Patient live with his wife in a mobile home with stairs to access but reports he is in excellent health and denies concern regarding home safety.  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  No discharge needs verbalized.  CM following. Patient plan is to discharge home via car driving self.       Final Discharge Disposition Code  01 - home or self-care              Destination       Coordination has not been started for this encounter.               Durable Medical Equipment       Coordination has not been started for this encounter.               Dialysis/Infusion       Coordination has not been started for this encounter.               Home  Medical Care       Coordination has not been started for this encounter.               Therapy       Coordination has not been started for this encounter.               Community Resources       Coordination has not been started for this encounter.               Expected Discharge Date and Time      Expected Discharge Date Expected Discharge Time     Mar 5, 2020                 Demographic Summary      Row Name 03/02/20 1525           General Information     Admission Type  inpatient     Arrived From  home     Referral Source  admission list     Reason for Consult  discharge planning     Preferred Language  English      Used During This Interaction  no     General Information Comments  [facility]           Contact Information     Permission Granted to Share Info With       Contact Information Obtained for       Contact Information Comments  Ale Zaragoza, spouse  [phone]          Functional Status      Row Name 03/02/20 1528           Functional Status     Usual Activity Tolerance  excellent     Current Activity Tolerance  good           Functional Status, IADL     Medications  independent     Meal Preparation  independent     Housekeeping  independent     Laundry  independent     Shopping  independent           Employment/     Employment/ Comments  Workmans Comp          Psychosocial    No documentation.         Abuse/Neglect    No documentation.         Legal    No documentation.         Substance Abuse    No documentation.         Patient Forms    No documentation.               ML Juarez Melissa G, RN      Case Management   Progress Notes   Signed   Date of Service:  03/03/20 1212   Creation Time:  03/03/20 1212            Signed             Show:Clear all  []Manual[x]Template[]Copied    Added by:  [x]Jennifer Brower RN    []Pro for details  Continued Stay Note  [facility]     Patient Name: Koffi Zaragoza                      MRN: [id number]  Today's Date: 3/3/2020                       Admit Date: 2/28/2020          Discharge Plan      Row Name 03/03/20 1211           Plan     Plan  update     Patient/Family in Agreement with Plan  yes     Plan Comments  Spoke with patient and wife at bedside regarding discharge plan.  Patient reports that it was indicated that he may be able to go home as early as tomorrow depending on labwork.  No discharge needs verbalized.  CM following.  Patient plan is to discharge home via car with family to transport.       Final Discharge Disposition Code  01 - home or self-care          Discharge Codes    No documentation.              Expected Discharge Date and Time      Expected Discharge Date Expected Discharge Time     Mar 5, 2020                  Jennifer Brower RN

## 2020-03-03 NOTE — PLAN OF CARE
Problem: Patient Care Overview  Goal: Plan of Care Review  Outcome: Ongoing (interventions implemented as appropriate)  Flowsheets (Taken 3/3/2020 0325)  Progress: improving  Plan of Care Reviewed With: patient; spouse  Outcome Summary: No acute events over night. IVF @200 continued. Pt showered. No complaints of pain. On Rm air. Vital signs stable. Will continue to monitor.     Problem: Fluid Volume Deficit (Adult)  Goal: Identify Related Risk Factors and Signs and Symptoms  Outcome: Ongoing (interventions implemented as appropriate)  Flowsheets (Taken 3/3/2020 0325)  Related Risk Factors (Fluid Volume Deficit): other (see comments)  Signs and Symptoms (Fluid Volume Deficit): lab value changes     Problem: Fluid Volume Deficit (Adult)  Goal: Optimal Fluid Balance  Outcome: Ongoing (interventions implemented as appropriate)  Flowsheets (Taken 3/3/2020 0325)  Optimal Fluid Balance: making progress toward outcome

## 2020-03-04 VITALS
TEMPERATURE: 98.2 F | BODY MASS INDEX: 23.72 KG/M2 | RESPIRATION RATE: 18 BRPM | SYSTOLIC BLOOD PRESSURE: 121 MMHG | HEIGHT: 78 IN | WEIGHT: 205 LBS | OXYGEN SATURATION: 97 % | HEART RATE: 74 BPM | DIASTOLIC BLOOD PRESSURE: 72 MMHG

## 2020-03-04 LAB
ALBUMIN SERPL-MCNC: 3.8 G/DL (ref 3.5–5.2)
ALBUMIN/GLOB SERPL: 1.5 G/DL
ALP SERPL-CCNC: 59 U/L (ref 39–117)
ALT SERPL W P-5'-P-CCNC: 184 U/L (ref 1–41)
ANION GAP SERPL CALCULATED.3IONS-SCNC: 12 MMOL/L (ref 5–15)
AST SERPL-CCNC: 113 U/L (ref 1–40)
BILIRUB SERPL-MCNC: 0.3 MG/DL (ref 0.2–1.2)
BUN BLD-MCNC: 7 MG/DL (ref 6–20)
BUN/CREAT SERPL: 9.1 (ref 7–25)
CALCIUM SPEC-SCNC: 8.9 MG/DL (ref 8.6–10.5)
CHLORIDE SERPL-SCNC: 100 MMOL/L (ref 98–107)
CK SERPL-CCNC: 1929 U/L (ref 20–200)
CO2 SERPL-SCNC: 26 MMOL/L (ref 22–29)
CREAT BLD-MCNC: 0.77 MG/DL (ref 0.76–1.27)
GFR SERPL CREATININE-BSD FRML MDRD: 122 ML/MIN/1.73
GLOBULIN UR ELPH-MCNC: 2.6 GM/DL
GLUCOSE BLD-MCNC: 114 MG/DL (ref 65–99)
POTASSIUM BLD-SCNC: 4.1 MMOL/L (ref 3.5–5.2)
PROT SERPL-MCNC: 6.4 G/DL (ref 6–8.5)
SODIUM BLD-SCNC: 138 MMOL/L (ref 136–145)

## 2020-03-04 PROCEDURE — 82550 ASSAY OF CK (CPK): CPT | Performed by: HOSPITALIST

## 2020-03-04 PROCEDURE — 80053 COMPREHEN METABOLIC PANEL: CPT | Performed by: HOSPITALIST

## 2020-03-04 PROCEDURE — 99238 HOSP IP/OBS DSCHRG MGMT 30/<: CPT | Performed by: INTERNAL MEDICINE

## 2020-03-04 RX ADMIN — SODIUM CHLORIDE 200 ML/HR: 9 INJECTION, SOLUTION INTRAVENOUS at 04:29

## 2020-03-04 RX ADMIN — SODIUM CHLORIDE 100 ML/HR: 9 INJECTION, SOLUTION INTRAVENOUS at 08:41

## 2020-03-04 NOTE — PROGRESS NOTES
Case Management Discharge Note      Final Note: Per MD rounds patient to discharge today.  Spoke with patient at bedside regarding discharge plan.  Per CM consult discussed PCP needs, patient merida carin TATE through his work but would like a PCP established in [place] with the [facility].  Patient reports his wife is already on the way.  No other discharge needs verbalized.  [facility] has been contacted and arrangements have been made and placed in AVS to establish car with Jason Drew DO.   Patient plan is to discharge today via car with family to transport.      Provided Post Acute Provider List?: N/A  N/A Provider List Comment: denies use or need    Destination      No service has been selected for the patient.      Durable Medical Equipment      No service has been selected for the patient.      Dialysis/Infusion      No service has been selected for the patient.      Home Medical Care      No service has been selected for the patient.      Therapy      No service has been selected for the patient.      Community Resources      No service has been selected for the patient.             Final Discharge Disposition Code: 01 - home or self-care   pale

## 2020-03-04 NOTE — PLAN OF CARE
Problem: Patient Care Overview  Goal: Plan of Care Review  Outcome: Ongoing (interventions implemented as appropriate)  Flowsheets (Taken 3/4/2020 0321)  Progress: improving  Plan of Care Reviewed With: patient; spouse  Outcome Summary: No acute events over night. IVF remains @200. PRN tylenol given for upper extremity pain. Showered this evening. On Rm air. Will continue to monitor.     Problem: Fluid Volume Deficit (Adult)  Goal: Identify Related Risk Factors and Signs and Symptoms  Outcome: Ongoing (interventions implemented as appropriate)  Flowsheets (Taken 3/4/2020 0321)  Related Risk Factors (Fluid Volume Deficit): other (see comments)  Signs and Symptoms (Fluid Volume Deficit): lab value changes     Problem: Fluid Volume Deficit (Adult)  Goal: Optimal Fluid Balance  Outcome: Ongoing (interventions implemented as appropriate)  Flowsheets (Taken 3/4/2020 0321)  Optimal Fluid Balance: making progress toward outcome

## 2020-03-04 NOTE — DISCHARGE SUMMARY
"    [facility] Medicine Services  DISCHARGE SUMMARY    Patient Name: Koffi Zaragoza  : 1995  MRN: [id number]    Date of Admission: 2020  1:20 PM  Date of Discharge:  3/4/2020  Primary Care Physician: Provider, No Known    Consults     No orders found from 2020 to 2020.          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          Hospital Course:  Koffi Zaragoza is a 24 y.o. male w/ hx IBS and bile salt malabsorption syndrome who presented to the ER due to muscle pain and dark colored urine after a strenuous workout that he sustained a few days prior. CPK wsa too high to detect initially w/ elevated aminotransferases consistent w/ rhabdomyolysis. Patient was aggressively hydrated w/ isotonic fluids and cpk has come down nicely to 1,900, urine clear, normal renal functoin w/ \"lft's down to 100's. Viral hepatitis panel negative. Patient feels well, tolerating po. Discharge home today, follow up w/ pcp (case management to establish w/ new [facility] pcp) w/ labs recommended in 3-5 days.     Discharge Follow Up Recommendations for outpatient labs/diagnostics:  Cpk, cmp at pcp follow up    Day of Discharge     HPI:   Other than mild diffuse muscle soreness which is improving, no other issues. No n/v/d. No fever. No dyspnea.    Review of Systems  No chest pain.    Vital Signs:   Temp:  [97.4 °F (36.3 °C)-98.4 °F (36.9 °C)] 98.2 °F (36.8 °C)  Heart Rate:  [70-74] 74  Resp:  [18] 18  BP: (115-121)/(68-77) 121/72     Physical Exam:  Constitutional:Alert, oriented x 3, nontoxic appearing  Psych:Normal/appropriate affect  HEENT:Ncat, oroph clear  Neck: neck supple, full range of " motion  Neuro: Face symmetric, speech clear, equal , moves all extremities  Cardiac: Rrr; No pretibial pitting edema  Resp: Ctab, normal effort  GI: abd soft, nontender  Skin: No extremity rash  Musculoskeletal/extremities: no cyanosis extremities; no significant ankle edema        Pertinent  and/or Most Recent Results     Results from last 7 days   Lab Units 03/04/20  0611 03/03/20  0605 03/02/20  0513 03/01/20  0957 02/29/20  0721 02/28/20  1317   WBC 10*3/mm3  --   --   --   --   --  4.28   HEMOGLOBIN g/dL  --   --   --   --   --  16.9   HEMATOCRIT %  --   --   --   --   --  51.3*   PLATELETS 10*3/mm3  --   --   --   --   --  197   SODIUM mmol/L 138 141 141 142 140 141   POTASSIUM mmol/L 4.1 4.3 3.9 3.9 4.4 5.0   CHLORIDE mmol/L 100 102 102 104 104 100   CO2 mmol/L 26.0 28.0 30.0* 30.0* 29.0 31.0*   BUN mg/dL 7 8 7 7 9 9   CREATININE mg/dL 0.77 0.70* 0.74* 0.82 0.87 0.93   GLUCOSE mg/dL 114* 101* 109* 91 116* 113*   CALCIUM mg/dL 8.9 8.8 8.6 8.8 9.1 9.6     Results from last 7 days   Lab Units 03/04/20  0611 03/03/20  0605 03/02/20  0513 03/01/20  0957 02/29/20  0721 02/28/20  1317   BILIRUBIN mg/dL 0.3 0.2 0.2 0.2 0.3 0.3   ALK PHOS U/L 59 60 64 63 59 69   ALT (SGPT) U/L 184* 198* 235* 276* 339* 447*   AST (SGOT) U/L 113* 179* 323* 526* 948* 1,501*           Invalid input(s): TG, LDLCALC, LDLREALC  Results from last 7 days   Lab Units 02/28/20  1626   LACTATE mmol/L 0.9       Brief Urine Lab Results  (Last result in the past 365 days)      Color   Clarity   Blood   Leuk Est   Nitrite   Protein   CREAT   Urine HCG        02/28/20 1318 Yellow Clear Large (3+) Negative Negative Negative               Microbiology Results Abnormal     None          Imaging Results (All)     None                         Plan for Follow-up of Pending Labs/Results: pcp    Discharge Details        Discharge Medications      Continue These Medications      Instructions Start Date   colestipol 1 g tablet  Commonly known as:   COLESTID   1 g, Oral, Daily      nortriptyline 25 MG capsule  Commonly known as:  PAMELOR   25 mg, Oral, Nightly             No Known Allergies      Discharge Disposition:  Home or Self Care    Diet:  Hospital:  Diet Order   Procedures   • Diet Regular       Activity:           CODE STATUS:    Code Status and Medical Interventions:   Ordered at: 02/28/20 1436     Level Of Support Discussed With:    Patient     Code Status:    CPR     Medical Interventions (Level of Support Prior to Arrest):    Full       Future Appointments   Date Time Provider Department Center   3/10/2020  9:00 AM Jason Drew DO [facility] None       Additional Instructions for the Follow-ups that You Need to Schedule     Discharge Follow-up with PCP   As directed       Currently Documented PCP: new appointment w/ Dr. Jason Drew   Provider, No Known    PCP Phone Number:    None     Follow Up Details:   to set up w/ pcp, follow up within 5 days               Time Spent on Discharge:  25 min    Electronically signed by Kam Baltazar MD, 03/04/20, 11:26 AM.

## 2020-03-05 ENCOUNTER — READMISSION MANAGEMENT (OUTPATIENT)
Dept: CALL CENTER | Facility: HOSPITAL | Age: 25
End: 2020-03-05

## 2020-03-05 NOTE — OUTREACH NOTE
Prep Survey      Responses   [facility] facility patient discharged from?  [place]   Is LACE score < 7 ?  No   Eligibility  Readm Mgmt   Discharge diagnosis  Rhabdomyolysis   Does the patient have one of the following disease processes/diagnoses(primary or secondary)?  Other   Does the patient have Home health ordered?  No   Is there a DME ordered?  No   Prep survey completed?  Yes          Keely Adame RN

## 2020-03-06 ENCOUNTER — READMISSION MANAGEMENT (OUTPATIENT)
Dept: CALL CENTER | Facility: HOSPITAL | Age: 25
End: 2020-03-06

## 2020-03-06 NOTE — OUTREACH NOTE
Medical Week 1 Survey      Responses   [facility] patient discharged from?  [place]   Does the patient have one of the following disease processes/diagnoses(primary or secondary)?  Other   Is there a successful TCM telephone encounter documented?  No   Week 1 attempt successful?  No   Unsuccessful attempts  Attempt 1          Sana Li RN

## 2020-03-09 ENCOUNTER — READMISSION MANAGEMENT (OUTPATIENT)
Dept: CALL CENTER | Facility: HOSPITAL | Age: 25
End: 2020-03-09

## 2020-03-09 NOTE — OUTREACH NOTE
Medical Week 1 Survey      Responses   [facility] patient discharged from?  [place]   Does the patient have one of the following disease processes/diagnoses(primary or secondary)?  Other   Is there a successful TCM telephone encounter documented?  No   Week 1 attempt successful?  No   Unsuccessful attempts  Attempt 2          Monet Drew RN

## 2020-03-11 ENCOUNTER — READMISSION MANAGEMENT (OUTPATIENT)
Dept: CALL CENTER | Facility: HOSPITAL | Age: 25
End: 2020-03-11

## 2020-03-11 NOTE — OUTREACH NOTE
Medical Week 1 Survey      Responses   [facility] [place] patient discharged from?  [place]   Does the patient have one of the following disease processes/diagnoses(primary or secondary)?  Other   Is there a successful TCM telephone encounter documented?  No   Week 1 attempt successful?  Yes   Call start time  1904   Call end time  1908   Discharge diagnosis  Rhabdomyolysis   Meds reviewed with patient/caregiver?  Yes   Is the patient having any side effects they believe may be caused by any medication additions or changes?  No   Does the patient have all medications ordered at discharge?  Yes   Is the patient taking all medications as directed (includes completed medication regime)?  Yes   Does the patient have a primary care provider?   Yes   Comments regarding PCP  Pt states he went to PCP appointment yesterday and due to this case being worer's comp r/t diagnosis and was instructed to go back to the clinic   Has the patient kept scheduled appointments due by today?  Yes   Comments  The clinic tomorrow morning for first    Psychosocial issues?  No   Did the patient receive a copy of their discharge instructions?  Yes   Nursing interventions  Reviewed instructions with patient   What is the patient's perception of their health status since discharge?  Improving   Is the patient/caregiver able to teach back signs and symptoms related to disease process for when to call PCP?  Yes   Is the patient/caregiver able to teach back signs and symptoms related to disease process for when to call 911?  Yes   Is the patient/caregiver able to teach back the hierarchy of who to call/visit for symptoms/problems? PCP, Specialist, Home health nurse, Urgent Care, ED, 911  Yes   Week 1 call completed?  Yes   Wrap up additional comments  Pt reports he is improving.  Denies any needs at this time.           Joanna Huang RN

## 2020-03-19 ENCOUNTER — READMISSION MANAGEMENT (OUTPATIENT)
Dept: CALL CENTER | Facility: HOSPITAL | Age: 25
End: 2020-03-19

## 2020-03-19 NOTE — OUTREACH NOTE
Medical Week 2 Survey      Responses   [facility] patient discharged from?  [place]   Does the patient have one of the following disease processes/diagnoses(primary or secondary)?  Other   Week 2 attempt successful?  Yes   Call start time  1210   Discharge diagnosis  Rhabdomyolysis   Call end time  1214   Meds reviewed with patient/caregiver?  Yes   Is the patient having any side effects they believe may be caused by any medication additions or changes?  No   Does the patient have all medications ordered at discharge?  N/A   Is the patient taking all medications as directed (includes completed medication regime)?  N/A   Does the patient have a primary care provider?   Yes   Does the patient have an appointment with their PCP within 7 days of discharge?  Yes   Has the patient kept scheduled appointments due by today?  Yes   Has home health visited the patient within 72 hours of discharge?  N/A   Psychosocial issues?  No   Comments  states is still weak, but pain level is decreasing   Did the patient receive a copy of their discharge instructions?  Yes   Nursing interventions  Reviewed instructions with patient   What is the patient's perception of their health status since discharge?  Improving   Is the patient/caregiver able to teach back signs and symptoms related to disease process for when to call PCP?  Yes   Is the patient/caregiver able to teach back signs and symptoms related to disease process for when to call 911?  Yes   Is the patient/caregiver able to teach back the hierarchy of who to call/visit for symptoms/problems? PCP, Specialist, Home health nurse, Urgent Care, ED, 911  Yes   Week 2 Call Completed?  Yes          Sindy Zayas RN

## 2020-03-27 ENCOUNTER — READMISSION MANAGEMENT (OUTPATIENT)
Dept: CALL CENTER | Facility: HOSPITAL | Age: 25
End: 2020-03-27

## 2020-03-27 NOTE — OUTREACH NOTE
Medical Week 3 Survey      Responses   [facility] patient discharged from?  [place]   Does the patient have one of the following disease processes/diagnoses(primary or secondary)?  Other   Week 3 attempt successful?  Yes   Call start time  1724   Call end time  1729   Discharge diagnosis  Rhabdomyolysis   Meds reviewed with patient/caregiver?  Yes   Is the patient taking all medications as directed (includes completed medication regime)?  Yes   Does the patient have a primary care provider?   Yes   Does the patient have an appointment with their PCP within 7 days of discharge?  Yes   Has the patient kept scheduled appointments due by today?  Yes   Has home health visited the patient within 72 hours of discharge?  N/A   Did the patient receive a copy of their discharge instructions?  Yes   Nursing interventions  Reviewed instructions with patient   What is the patient's perception of their health status since discharge?  Improving   Is the patient/caregiver able to teach back signs and symptoms related to disease process for when to call PCP?  Yes   Is the patient/caregiver able to teach back signs and symptoms related to disease process for when to call 911?  Yes   Is the patient/caregiver able to teach back the hierarchy of who to call/visit for symptoms/problems? PCP, Specialist, Home health nurse, Urgent Care, ED, 911  Yes   Additional teach back comments  Patient states he is doing very well.  He has followed up with his PCP office and all labs came back normal and they are releasing him back to work Monday.  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     Week 3 Call Completed?  Yes   Graduated  Yes   Did the patient feel the follow up calls were helpful during their recovery period?  Yes   Was the number of calls appropriate?  Yes   Wrap up additional comments  No questitons or needs          Irish Metz LPN

## 2023-09-17 ENCOUNTER — HOSPITAL ENCOUNTER (EMERGENCY)
Age: 28
Discharge: HOME | End: 2023-09-17
Payer: COMMERCIAL

## 2023-09-17 VITALS
HEART RATE: 93 BPM | OXYGEN SATURATION: 100 % | RESPIRATION RATE: 16 BRPM | SYSTOLIC BLOOD PRESSURE: 146 MMHG | DIASTOLIC BLOOD PRESSURE: 99 MMHG

## 2023-09-17 VITALS
OXYGEN SATURATION: 100 % | DIASTOLIC BLOOD PRESSURE: 107 MMHG | SYSTOLIC BLOOD PRESSURE: 164 MMHG | HEART RATE: 109 BPM | TEMPERATURE: 97.9 F | RESPIRATION RATE: 13 BRPM

## 2023-09-17 VITALS
HEART RATE: 101 BPM | OXYGEN SATURATION: 99 % | TEMPERATURE: 98.24 F | RESPIRATION RATE: 16 BRPM | DIASTOLIC BLOOD PRESSURE: 96 MMHG | SYSTOLIC BLOOD PRESSURE: 161 MMHG

## 2023-09-17 VITALS
DIASTOLIC BLOOD PRESSURE: 85 MMHG | HEART RATE: 85 BPM | RESPIRATION RATE: 18 BRPM | SYSTOLIC BLOOD PRESSURE: 142 MMHG | OXYGEN SATURATION: 100 % | TEMPERATURE: 97.8 F

## 2023-09-17 VITALS
RESPIRATION RATE: 13 BRPM | DIASTOLIC BLOOD PRESSURE: 105 MMHG | HEART RATE: 93 BPM | SYSTOLIC BLOOD PRESSURE: 157 MMHG | TEMPERATURE: 97.9 F | OXYGEN SATURATION: 100 %

## 2023-09-17 VITALS
OXYGEN SATURATION: 97 % | SYSTOLIC BLOOD PRESSURE: 142 MMHG | HEART RATE: 85 BPM | RESPIRATION RATE: 28 BRPM | DIASTOLIC BLOOD PRESSURE: 98 MMHG

## 2023-09-17 VITALS
OXYGEN SATURATION: 100 % | HEART RATE: 91 BPM | SYSTOLIC BLOOD PRESSURE: 146 MMHG | RESPIRATION RATE: 15 BRPM | DIASTOLIC BLOOD PRESSURE: 101 MMHG | TEMPERATURE: 97.7 F

## 2023-09-17 VITALS
HEART RATE: 85 BPM | DIASTOLIC BLOOD PRESSURE: 83 MMHG | OXYGEN SATURATION: 97 % | SYSTOLIC BLOOD PRESSURE: 126 MMHG | TEMPERATURE: 97.52 F | RESPIRATION RATE: 16 BRPM

## 2023-09-17 VITALS
SYSTOLIC BLOOD PRESSURE: 161 MMHG | RESPIRATION RATE: 10 BRPM | DIASTOLIC BLOOD PRESSURE: 92 MMHG | OXYGEN SATURATION: 99 % | HEART RATE: 97 BPM

## 2023-09-17 VITALS
OXYGEN SATURATION: 99 % | SYSTOLIC BLOOD PRESSURE: 152 MMHG | HEART RATE: 131 BPM | RESPIRATION RATE: 13 BRPM | DIASTOLIC BLOOD PRESSURE: 98 MMHG

## 2023-09-17 VITALS
OXYGEN SATURATION: 94 % | RESPIRATION RATE: 26 BRPM | DIASTOLIC BLOOD PRESSURE: 96 MMHG | SYSTOLIC BLOOD PRESSURE: 155 MMHG | HEART RATE: 97 BPM

## 2023-09-17 VITALS
OXYGEN SATURATION: 99 % | DIASTOLIC BLOOD PRESSURE: 98 MMHG | SYSTOLIC BLOOD PRESSURE: 151 MMHG | RESPIRATION RATE: 16 BRPM | HEART RATE: 83 BPM

## 2023-09-17 VITALS
RESPIRATION RATE: 15 BRPM | SYSTOLIC BLOOD PRESSURE: 127 MMHG | HEART RATE: 69 BPM | DIASTOLIC BLOOD PRESSURE: 94 MMHG | OXYGEN SATURATION: 99 %

## 2023-09-17 VITALS
HEART RATE: 92 BPM | RESPIRATION RATE: 11 BRPM | SYSTOLIC BLOOD PRESSURE: 131 MMHG | DIASTOLIC BLOOD PRESSURE: 96 MMHG | OXYGEN SATURATION: 97 %

## 2023-09-17 VITALS
DIASTOLIC BLOOD PRESSURE: 93 MMHG | OXYGEN SATURATION: 99 % | RESPIRATION RATE: 13 BRPM | HEART RATE: 95 BPM | SYSTOLIC BLOOD PRESSURE: 147 MMHG

## 2023-09-17 VITALS
SYSTOLIC BLOOD PRESSURE: 165 MMHG | DIASTOLIC BLOOD PRESSURE: 101 MMHG | OXYGEN SATURATION: 100 % | TEMPERATURE: 98.06 F | HEART RATE: 131 BPM | RESPIRATION RATE: 13 BRPM

## 2023-09-17 VITALS
HEART RATE: 93 BPM | TEMPERATURE: 98 F | SYSTOLIC BLOOD PRESSURE: 161 MMHG | OXYGEN SATURATION: 99 % | DIASTOLIC BLOOD PRESSURE: 96 MMHG | RESPIRATION RATE: 13 BRPM

## 2023-09-17 VITALS
HEART RATE: 92 BPM | OXYGEN SATURATION: 98 % | SYSTOLIC BLOOD PRESSURE: 153 MMHG | RESPIRATION RATE: 23 BRPM | DIASTOLIC BLOOD PRESSURE: 95 MMHG

## 2023-09-17 VITALS
RESPIRATION RATE: 30 BRPM | DIASTOLIC BLOOD PRESSURE: 88 MMHG | OXYGEN SATURATION: 97 % | HEART RATE: 84 BPM | SYSTOLIC BLOOD PRESSURE: 141 MMHG

## 2023-09-17 VITALS
RESPIRATION RATE: 14 BRPM | DIASTOLIC BLOOD PRESSURE: 111 MMHG | OXYGEN SATURATION: 100 % | TEMPERATURE: 97.88 F | HEART RATE: 109 BPM | SYSTOLIC BLOOD PRESSURE: 168 MMHG

## 2023-09-17 VITALS — SYSTOLIC BLOOD PRESSURE: 168 MMHG | DIASTOLIC BLOOD PRESSURE: 100 MMHG | OXYGEN SATURATION: 100 % | HEART RATE: 92 BPM

## 2023-09-17 VITALS
SYSTOLIC BLOOD PRESSURE: 138 MMHG | OXYGEN SATURATION: 99 % | HEART RATE: 81 BPM | RESPIRATION RATE: 16 BRPM | DIASTOLIC BLOOD PRESSURE: 97 MMHG

## 2023-09-17 VITALS — BODY MASS INDEX: 25.3 KG/M2

## 2023-09-17 DIAGNOSIS — F17.200: ICD-10-CM
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
## 2023-09-17 DIAGNOSIS — S53.124A: Primary | ICD-10-CM

## 2023-09-17 PROCEDURE — 99153 MOD SED SAME PHYS/QHP EA: CPT

## 2023-09-17 PROCEDURE — 99285 EMERGENCY DEPT VISIT HI MDM: CPT

## 2023-09-17 PROCEDURE — 73070 X-RAY EXAM OF ELBOW: CPT

## 2023-09-17 PROCEDURE — 96375 TX/PRO/DX INJ NEW DRUG ADDON: CPT

## 2023-09-17 PROCEDURE — 96374 THER/PROPH/DIAG INJ IV PUSH: CPT

## 2023-09-17 PROCEDURE — 73090 X-RAY EXAM OF FOREARM: CPT

## 2023-09-17 PROCEDURE — 73060 X-RAY EXAM OF HUMERUS: CPT

## 2023-09-17 PROCEDURE — 99152 MOD SED SAME PHYS/QHP 5/>YRS: CPT

## 2023-11-24 ENCOUNTER — HOSPITAL ENCOUNTER (OUTPATIENT)
Dept: HOSPITAL 22 - OT | Age: 28
Discharge: HOME | End: 2023-11-24
Payer: COMMERCIAL

## 2023-11-24 DIAGNOSIS — S53.104A: Primary | ICD-10-CM

## 2023-11-24 PROCEDURE — G0283 ELEC STIM OTHER THAN WOUND: HCPCS

## 2023-11-24 PROCEDURE — 97035 APP MDLTY 1+ULTRASOUND EA 15: CPT

## 2023-11-24 PROCEDURE — 97110 THERAPEUTIC EXERCISES: CPT

## 2023-11-24 PROCEDURE — 97140 MANUAL THERAPY 1/> REGIONS: CPT

## 2023-11-24 PROCEDURE — 97010 HOT OR COLD PACKS THERAPY: CPT

## 2023-11-24 PROCEDURE — 97165 OT EVAL LOW COMPLEX 30 MIN: CPT

## 2023-11-24 PROCEDURE — 97014 ELECTRIC STIMULATION THERAPY: CPT
